# Patient Record
Sex: MALE | URBAN - METROPOLITAN AREA
[De-identification: names, ages, dates, MRNs, and addresses within clinical notes are randomized per-mention and may not be internally consistent; named-entity substitution may affect disease eponyms.]

---

## 2023-02-28 ENCOUNTER — APPOINTMENT (RX ONLY)
Dept: URBAN - METROPOLITAN AREA CLINIC 51 | Facility: CLINIC | Age: 73
Setting detail: DERMATOLOGY
End: 2023-02-28

## 2023-02-28 PROBLEM — C44.219 BASAL CELL CARCINOMA OF SKIN OF LEFT EAR AND EXTERNAL AURICULAR CANAL: Status: ACTIVE | Noted: 2023-02-28

## 2023-02-28 PROCEDURE — 14061 TIS TRNFR E/N/E/L10.1-30SQCM: CPT

## 2023-02-28 PROCEDURE — ? PRESCRIPTION

## 2023-02-28 PROCEDURE — 17311 MOHS 1 STAGE H/N/HF/G: CPT

## 2023-02-28 PROCEDURE — ? MOHS SURGERY

## 2023-02-28 PROCEDURE — ? REPAIR NOTE

## 2023-02-28 RX ORDER — SULFAMETHOXAZOLE AND TRIMETHOPRIM 800; 160 MG/1; MG/1
TABLET ORAL
Qty: 4 | Refills: 0 | Status: ERX | COMMUNITY
Start: 2023-02-28

## 2023-02-28 RX ADMIN — SULFAMETHOXAZOLE AND TRIMETHOPRIM: 800; 160 TABLET ORAL at 00:00

## 2023-02-28 NOTE — PROCEDURE: REPAIR NOTE
Unique Flap 2 Text: The INSERT NAME OF MUSCLE muscle was meticulously dissected and approximated with the least amount of tension. The subcutaneous tissue was approximated followed by dermis. Cody points were approximated first followed by epidermal closure. Dressing was applied.

## 2023-02-28 NOTE — PROCEDURE: MOHS SURGERY
Mohs Histo Method Verbiage: The excised tissue was then mapped, color-coded and submitted to the in-house Mohs lab for tissue processing using the Mohs protocol.\\n\\Nathan histologic findings, including description and depth of tumor present at margins (if any) and presence or absence of perineural involvement, or scar, is noted on the Mohs map.

## 2023-03-20 NOTE — PROCEDURE: REPAIR NOTE
Spironolactone Counseling: Patient advised regarding risks of diarrhea, abdominal pain, hyperkalemia, birth defects (for female patients), liver toxicity and renal toxicity. The patient may need blood work to monitor liver and kidney function and potassium levels while on therapy. The patient verbalized understanding of the proper use and possible adverse effects of spironolactone.  All of the patient's questions and concerns were addressed. Complex Repair And Flap Additional Text (Will Appearing After The Standard Complex Repair Text): The complex repair was not sufficient to completely close the primary defect. The remaining additional defect was repaired with the flap mentioned below.

## 2024-02-24 NOTE — PROCEDURE: MOHS SURGERY
Interval History: Feels well. Vac changed yesterday in OR. Pain controlled. Denies fever or chills.    Review of Systems   Constitutional:  Negative for chills and fever.   Respiratory:  Negative for cough.    Cardiovascular:  Positive for leg swelling.   Gastrointestinal:  Negative for diarrhea.   Genitourinary:  Negative for dysuria.   All other systems reviewed and are negative.    Objective:     Vital Signs (Most Recent):  Temp: 97.9 °F (36.6 °C) (02/24/24 1103)  Pulse: 81 (02/24/24 1103)  Resp: 19 (02/24/24 1103)  BP: 121/77 (02/24/24 1103)  SpO2: (!) 91 % (02/24/24 1103) Vital Signs (24h Range):  Temp:  [97.5 °F (36.4 °C)-99 °F (37.2 °C)] 97.9 °F (36.6 °C)  Pulse:  [70-95] 81  Resp:  [16-20] 19  SpO2:  [91 %-98 %] 91 %  BP: (120-158)/(70-88) 121/77     Weight: 93.6 kg (206 lb 5.6 oz)  Body mass index is 35.42 kg/m².    Estimated Creatinine Clearance: 78.4 mL/min (based on SCr of 0.9 mg/dL).     Physical Exam  Vitals and nursing note reviewed.   Constitutional:       Appearance: Normal appearance.   HENT:      Head: Normocephalic.      Mouth/Throat:      Mouth: Mucous membranes are moist.   Eyes:      Extraocular Movements: Extraocular movements intact.      Pupils: Pupils are equal, round, and reactive to light.   Cardiovascular:      Rate and Rhythm: Normal rate.   Abdominal:      Tenderness: There is no abdominal tenderness. There is no guarding or rebound.   Musculoskeletal:         General: Tenderness and deformity present.   Neurological:      General: No focal deficit present.      Mental Status: She is alert and oriented to person, place, and time.   Psychiatric:         Mood and Affect: Mood normal.         Behavior: Behavior normal.         Thought Content: Thought content normal.          Significant Labs: Blood Culture:   Recent Labs   Lab 02/15/24  2321 02/17/24  2005 02/17/24  2006 02/21/24  1732 02/21/24  1744   LABBLOO Gram stain portia bottle: Gram positive rods  Results called to and read back  by:KAMALA STEELE  02/17/2024  18:30  LACTOBACILLUS SPECIES  Further identified as Lactobacillus jensenii  * No Growth after 4 days. Gram stain aer bottle: Gram positive rods  Results called to and read back by:Norma arreola 02/21/2024  14:27  LACTOBACILLUS SPECIES* No Growth to date  No Growth to date  No Growth to date No Growth to date  No Growth to date  No Growth to date     CBC:   Recent Labs   Lab 02/23/24  0412 02/24/24  0422   WBC 19.69* 20.96*   HGB 8.4* 7.6*   HCT 26.6* 25.0*    254     CMP:   Recent Labs   Lab 02/23/24  0412 02/24/24  0422    138   K 5.0 5.4*    102   CO2 27 28   GLU 50* 155*   BUN 17 17   CREATININE 0.8 0.9   CALCIUM 8.2* 8.0*   PROT 5.8* 5.4*   ALBUMIN 1.4* 1.4*   BILITOT 0.1 0.1   ALKPHOS 122 102   AST 17 12   ALT 11 11   ANIONGAP 7* 8     Wound Culture:   Recent Labs   Lab 02/17/24  0911   LABAERO LACTOBACILLUS SPECIES  Many  *       Significant Imaging: I have reviewed all pertinent imaging results/findings within the past 24 hours.   Ear Star Wedge Flap Text: The defect edges were debeveled with a #15 blade scalpel.  Given the location of the defect and the proximity to free margins (helical rim) an ear star wedge flap was deemed most appropriate.  Using a sterile surgical marker, the appropriate flap was drawn incorporating the defect and placing the expected incisions between the helical rim and antihelix where possible.  The area thus outlined was incised through and through with a #15 scalpel blade.